# Patient Record
Sex: MALE
[De-identification: names, ages, dates, MRNs, and addresses within clinical notes are randomized per-mention and may not be internally consistent; named-entity substitution may affect disease eponyms.]

---

## 2018-04-06 ENCOUNTER — HOSPITAL ENCOUNTER (EMERGENCY)
Dept: HOSPITAL 5 - ED | Age: 48
LOS: 1 days | Discharge: HOME | End: 2018-04-07
Payer: COMMERCIAL

## 2018-04-06 DIAGNOSIS — I10: ICD-10-CM

## 2018-04-06 DIAGNOSIS — R07.89: Primary | ICD-10-CM

## 2018-04-06 LAB
BASOPHILS # (AUTO): 0.1 K/MM3 (ref 0–0.1)
BASOPHILS NFR BLD AUTO: 0.5 % (ref 0–1.8)
BILIRUB UR QL STRIP: (no result)
BLOOD UR QL VISUAL: (no result)
BUN SERPL-MCNC: 12 MG/DL (ref 9–20)
BUN/CREAT SERPL: 15 %
CALCIUM SERPL-MCNC: 9.2 MG/DL (ref 8.4–10.2)
EOSINOPHIL # BLD AUTO: 0.2 K/MM3 (ref 0–0.4)
EOSINOPHIL NFR BLD AUTO: 2.2 % (ref 0–4.3)
HCT VFR BLD CALC: 42.9 % (ref 35.5–45.6)
HEMOLYSIS INDEX: 17
HGB BLD-MCNC: 15 GM/DL (ref 11.8–15.2)
LYMPHOCYTES # BLD AUTO: 3.8 K/MM3 (ref 1.2–5.4)
LYMPHOCYTES NFR BLD AUTO: 39.5 % (ref 13.4–35)
MCH RBC QN AUTO: 33 PG (ref 28–32)
MCHC RBC AUTO-ENTMCNC: 35 % (ref 32–34)
MCV RBC AUTO: 94 FL (ref 84–94)
MONOCYTES # (AUTO): 0.8 K/MM3 (ref 0–0.8)
MONOCYTES % (AUTO): 8.3 % (ref 0–7.3)
MUCOUS THREADS #/AREA URNS HPF: (no result) /HPF
PH UR STRIP: 5 [PH] (ref 5–7)
PLATELET # BLD: 259 K/MM3 (ref 140–440)
RBC # BLD AUTO: 4.59 M/MM3 (ref 3.65–5.03)
RBC #/AREA URNS HPF: 2 /HPF (ref 0–6)
UROBILINOGEN UR-MCNC: 2 MG/DL (ref ?–2)
WBC #/AREA URNS HPF: 8 /HPF (ref 0–6)

## 2018-04-06 PROCEDURE — 36415 COLL VENOUS BLD VENIPUNCTURE: CPT

## 2018-04-06 PROCEDURE — 85379 FIBRIN DEGRADATION QUANT: CPT

## 2018-04-06 PROCEDURE — 93005 ELECTROCARDIOGRAM TRACING: CPT

## 2018-04-06 PROCEDURE — 99283 EMERGENCY DEPT VISIT LOW MDM: CPT

## 2018-04-06 PROCEDURE — 85025 COMPLETE CBC W/AUTO DIFF WBC: CPT

## 2018-04-06 PROCEDURE — 81001 URINALYSIS AUTO W/SCOPE: CPT

## 2018-04-06 PROCEDURE — 93010 ELECTROCARDIOGRAM REPORT: CPT

## 2018-04-06 PROCEDURE — 84484 ASSAY OF TROPONIN QUANT: CPT

## 2018-04-06 PROCEDURE — 80048 BASIC METABOLIC PNL TOTAL CA: CPT

## 2018-04-07 VITALS — DIASTOLIC BLOOD PRESSURE: 66 MMHG | SYSTOLIC BLOOD PRESSURE: 120 MMHG

## 2018-04-07 NOTE — EMERGENCY DEPARTMENT REPORT
HPI





- General


Chief Complaint: Chest Pain


Time Seen by Provider: 04/07/18 03:37





- HPI


HPI: 





48-year-old male presents to the ED with chest pain chest pain, onset 6 hours 

prior to evaluation while at rest, Location: mid chest


Radiation: none, Severity now (0-10): 2, Severity at worst (0-10): 8 Duration: 

5 minutes characterized as: sharp. The pain is relieved with nothing,


Patient denies exertional pain, patient denies pleuritic pain.  Patient denies 

associated symptoms, such as nausea/vomiting, no diaphoresis, no shortness of 

breath.





ED Past Medical Hx





- Past Medical History


Hx Hypertension: Yes





- Surgical History


Additional Surgical History: prostate ca -2008





- Social History


Smoking Status: Never Smoker


Substance Use Type: Alcohol





ED Review of Systems


ROS: 


Stated complaint: CP; SOB


Other details as noted in HPI





Comment: All other systems reviewed and negative


Respiratory: denies: cough


Cardiovascular: chest pain


Endocrine: denies: excessive sweating





Physical Exam





- Physical Exam


Vital Signs: 


 Vital Signs











  04/06/18 04/07/18 04/07/18





  22:03 02:07 02:15


 


Temperature 98.4 F  


 


Pulse Rate 66 54 L 55 L


 


Respiratory 18 17 13





Rate   


 


Blood Pressure 148/83  120/66


 


O2 Sat by Pulse 98 96 96





Oximetry   














  04/07/18 04/07/18





  02:31 02:45


 


Temperature  


 


Pulse Rate 60 59 L


 


Respiratory 12 13





Rate  


 


Blood Pressure 120/66 120/66


 


O2 Sat by Pulse 97 97





Oximetry  











Physical Exam: 





- Physical Exam


Physical Exam: 





- General


Limitations: No Limitations


General appearance: alert, in no apparent distress.





- Head


Head exam: Present: atraumatic, normocephalic





- Eye


Eye exam: Present: normal appearance





- ENT


ENT exam: Present: mucous membranes moist





- Neck


Neck exam: Present: normal inspection





- Respiratory


Respiratory exam: Present: normal lung sounds bilaterally.  Absent: respiratory 

distress





- Cardiovascular


Cardiovascular Exam: Present: normal rhythm, normal rate.  Absent: systolic 

murmur, diastolic murmur, rubs, gallop





- GI/Abdominal


GI/Abdominal exam: Present: soft, normal bowel sounds





- Extremities Exam


Extremities exam: Present: normal inspection





- Back Exam


Back exam: Present: normal inspection





- Neurological Exam


Neurological exam: Present: alert, oriented X3





- Psychiatric


Psychiatric exam: normal affect and mood





- Skin


Skin exam: Present: warm, dry, intact, normal color.  Absent: rash





ED Course


 Vital Signs











  04/06/18 04/07/18 04/07/18





  22:03 02:07 02:15


 


Temperature 98.4 F  


 


Pulse Rate 66 54 L 55 L


 


Respiratory 18 17 13





Rate   


 


Blood Pressure 148/83  120/66


 


O2 Sat by Pulse 98 96 96





Oximetry   














  04/07/18 04/07/18





  02:31 02:45


 


Temperature  


 


Pulse Rate 60 59 L


 


Respiratory 12 13





Rate  


 


Blood Pressure 120/66 120/66


 


O2 Sat by Pulse 97 97





Oximetry  














- Reevaluation(s)


Reevaluation #1: 





04/07/18 05:18


Advised admission but patient refused





ED Medical Decision Making





- Lab Data


Result diagrams: 


 04/06/18 22:13





 04/06/18 22:13


Critical care attestation.: 


If time is entered above; I have spent that time in minutes in the direct care 

of this critically ill patient, excluding procedure time.








ED Disposition


Clinical Impression: 


Chest pain


Qualifiers:


 Chest pain type: other chest pain Qualified Code(s): R07.89 - Other chest pain





Disposition: DC-01 TO HOME OR SELFCARE


Is pt being admited?: No


Does the pt Need Aspirin: No


Condition: Stable


Instructions:  Chest Pain (ED)


Referrals: 


EVELINE CLARK MD [Primary Care Provider] - 3-5 Days


WOJCIECH STONE MD [Staff Physician] - 3-5 Days